# Patient Record
Sex: FEMALE
[De-identification: names, ages, dates, MRNs, and addresses within clinical notes are randomized per-mention and may not be internally consistent; named-entity substitution may affect disease eponyms.]

---

## 2021-08-13 ENCOUNTER — DASHBOARD ENCOUNTERS (OUTPATIENT)
Age: 36
End: 2021-08-13

## 2021-08-13 ENCOUNTER — OFFICE VISIT (OUTPATIENT)
Dept: URBAN - METROPOLITAN AREA TELEHEALTH 2 | Facility: TELEHEALTH | Age: 36
End: 2021-08-13

## 2021-08-13 ENCOUNTER — OFFICE VISIT (OUTPATIENT)
Dept: URBAN - METROPOLITAN AREA TELEHEALTH 2 | Facility: TELEHEALTH | Age: 36
End: 2021-08-13
Payer: COMMERCIAL

## 2021-08-13 DIAGNOSIS — R14.0 ABDOMINAL BLOATING: ICD-10-CM

## 2021-08-13 DIAGNOSIS — D18.03 LIVER HEMANGIOMA: ICD-10-CM

## 2021-08-13 DIAGNOSIS — Q85.8 VON HIPPEL-LINDAU DISEASE: ICD-10-CM

## 2021-08-13 PROCEDURE — 99203 OFFICE O/P NEW LOW 30 MIN: CPT | Performed by: INTERNAL MEDICINE

## 2021-08-13 RX ORDER — LORAZEPAM 1 MG/1
1 TABLET AT BEDTIME AS NEEDED TABLET ORAL ONCE A DAY
Status: ACTIVE | COMMUNITY

## 2021-08-13 NOTE — HPI-TODAY'S VISIT:
Patient referred by Chad Gore MD for evaluation of  liver lesion. This is a Telehealth OV to which patient has agreed to. Limitations of telehealth discussed; she understands and agrees to proceed.  Copy of this consult OV sent to Dr. Gore. 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 Patient uncertain of Type of VHL. father w/ VHL and sister w/ negative genetic test for VHL.  She has been experiencing some p-prandial bloating and gas ,w/o N/V / nor change in bowel pattern. No constitutional sxs. No UGI sxs and denies anorexia or weight loss. She has no cardiorespiratory nor ENT sxs. She has been on a healthy diet. No other complaints. No COVID-19 exposure and has received 2 doses of COVID-19 vaccine.